# Patient Record
Sex: FEMALE | Race: WHITE | NOT HISPANIC OR LATINO | URBAN - METROPOLITAN AREA
[De-identification: names, ages, dates, MRNs, and addresses within clinical notes are randomized per-mention and may not be internally consistent; named-entity substitution may affect disease eponyms.]

---

## 2020-08-04 ENCOUNTER — OFFICE VISIT (OUTPATIENT)
Dept: URBAN - METROPOLITAN AREA CLINIC 82 | Facility: CLINIC | Age: 65
End: 2020-08-04
Payer: COMMERCIAL

## 2020-08-04 DIAGNOSIS — Z12.11 COLON CANCER SCREENING: ICD-10-CM

## 2020-08-04 DIAGNOSIS — K58.0 IRRITABLE BOWEL SYNDROME WITH DIARRHEA: ICD-10-CM

## 2020-08-04 DIAGNOSIS — R10.84 GENERALIZED ABDOMINAL PAIN: ICD-10-CM

## 2020-08-04 PROCEDURE — G8417 CALC BMI ABV UP PARAM F/U: HCPCS | Performed by: INTERNAL MEDICINE

## 2020-08-04 PROCEDURE — G9903 PT SCRN TBCO ID AS NON USER: HCPCS | Performed by: INTERNAL MEDICINE

## 2020-08-04 PROCEDURE — 99244 OFF/OP CNSLTJ NEW/EST MOD 40: CPT | Performed by: INTERNAL MEDICINE

## 2020-08-04 PROCEDURE — 99204 OFFICE O/P NEW MOD 45 MIN: CPT | Performed by: INTERNAL MEDICINE

## 2020-08-04 PROCEDURE — 1036F TOBACCO NON-USER: CPT | Performed by: INTERNAL MEDICINE

## 2020-08-04 PROCEDURE — G8427 DOCREV CUR MEDS BY ELIG CLIN: HCPCS | Performed by: INTERNAL MEDICINE

## 2020-08-04 RX ORDER — POLYETHYLENE GLYCOL-3350 AND ELECTROLYTES WITH FLAVOR PACK 240; 5.84; 2.98; 6.72; 22.72 G/278.26G; G/278.26G; G/278.26G; G/278.26G; G/278.26G
AS DIRECTED POWDER, FOR SOLUTION ORAL
Qty: 1 | Refills: 0 | OUTPATIENT
Start: 2020-08-04 | End: 2020-08-05

## 2020-08-04 RX ORDER — HYOSCYAMINE SULFATE 0.12 MG/1
1 TABLET AS NEEDED TABLET ORAL
Qty: 90 | Refills: 1 | OUTPATIENT
Start: 2020-08-04 | End: 2020-10-03

## 2020-08-04 RX ORDER — PEPPERMINT OIL 90 MG
AS DIRECTED CAPSULE, DELAYED, AND EXTENDED RELEASE ORAL
Status: ACTIVE | COMMUNITY

## 2020-08-04 RX ORDER — CHOLESTYRAMINE 4 G/9G
1 PACKET MIXED WITH WATER OR NON-CARBONATED DRINK POWDER, FOR SUSPENSION ORAL TWICE A DAY
Qty: 60 | Refills: 1 | OUTPATIENT
Start: 2020-08-04

## 2020-08-04 RX ORDER — LISINOPRIL 10 MG/1
TAKE 1 TABLET (10 MG) BY ORAL ROUTE ONCE DAILY TABLET ORAL 1
Qty: 0 | Refills: 0 | Status: ACTIVE | COMMUNITY
Start: 1900-01-01

## 2020-08-04 NOTE — HPI-TODAY'S VISIT:
08/04/2020 Patient is a 65 year old, White female, who was last seen in 2017, for IBS. Patient states that her IBS has been bothering her alot this year. She states her work hours have been adjusted to 7 am, and she wakes up with loose bowels making her either late for work or to have bowel accidents. She does report more stress as there is a heavier workload on her. She has never tried Questran powder in the past. She is sleeping well at night. She is not on any anxiety medications. She has had a colonoscopy July 27, 2010 showed small internal hemorrhoids. She is due for a colonoscopy this year. She states her grandfather passed away from stomach and colon cancer. She does report gluten and dairy sensitivity. She states that in the past, Levsin worked for her IBS.

## 2020-08-13 ENCOUNTER — OFFICE VISIT (OUTPATIENT)
Dept: URBAN - METROPOLITAN AREA MEDICAL CENTER 31 | Facility: MEDICAL CENTER | Age: 65
End: 2020-08-13
Payer: COMMERCIAL

## 2020-08-13 DIAGNOSIS — R19.4 ALTERED BOWEL HABITS: ICD-10-CM

## 2020-08-13 PROCEDURE — G9937 DIG OR SURV COLSCO: HCPCS | Performed by: INTERNAL MEDICINE

## 2020-08-13 PROCEDURE — 45380 COLONOSCOPY AND BIOPSY: CPT | Performed by: INTERNAL MEDICINE

## 2020-08-28 ENCOUNTER — ERX REFILL RESPONSE (OUTPATIENT)
Dept: URBAN - METROPOLITAN AREA CLINIC 82 | Facility: CLINIC | Age: 65
End: 2020-08-28

## 2020-08-28 RX ORDER — HYOSCYAMINE SULFATE 0.12 MG/1
1 TABLET AS NEEDED TABLET ORAL
Qty: 90 | Refills: 1

## 2020-08-28 RX ORDER — CHOLESTYRAMINE 4 G/9G
1 PACKET MIXED WITH WATER OR NON-CARBONATED DRINK POWDER, FOR SUSPENSION ORAL TWICE A DAY
Qty: 60 | Refills: 1

## 2020-09-15 ENCOUNTER — DASHBOARD ENCOUNTERS (OUTPATIENT)
Age: 65
End: 2020-09-15

## 2020-09-15 ENCOUNTER — OFFICE VISIT (OUTPATIENT)
Dept: URBAN - METROPOLITAN AREA CLINIC 82 | Facility: CLINIC | Age: 65
End: 2020-09-15
Payer: COMMERCIAL

## 2020-09-15 DIAGNOSIS — R10.84 GENERALIZED ABDOMINAL PAIN: ICD-10-CM

## 2020-09-15 DIAGNOSIS — Z12.11 COLON CANCER SCREENING: ICD-10-CM

## 2020-09-15 DIAGNOSIS — R19.7 DIARRHEA: ICD-10-CM

## 2020-09-15 DIAGNOSIS — K58.9 IRRITABLE BOWEL SYNDROME (IBS): ICD-10-CM

## 2020-09-15 PROBLEM — 102614006 GENERALIZED ABDOMINAL PAIN: Status: ACTIVE | Noted: 2020-08-04

## 2020-09-15 PROBLEM — 275978004 COLON CANCER SCREENING: Status: ACTIVE | Noted: 2020-08-04

## 2020-09-15 PROBLEM — 10743008 IRRITABLE BOWEL SYNDROME: Status: ACTIVE | Noted: 2020-08-04

## 2020-09-15 PROCEDURE — G8417 CALC BMI ABV UP PARAM F/U: HCPCS | Performed by: INTERNAL MEDICINE

## 2020-09-15 PROCEDURE — 99214 OFFICE O/P EST MOD 30 MIN: CPT | Performed by: INTERNAL MEDICINE

## 2020-09-15 PROCEDURE — 3017F COLORECTAL CA SCREEN DOC REV: CPT | Performed by: INTERNAL MEDICINE

## 2020-09-15 PROCEDURE — 1036F TOBACCO NON-USER: CPT | Performed by: INTERNAL MEDICINE

## 2020-09-15 RX ORDER — HYOSCYAMINE SULFATE 0.12 MG/1
1 TABLET AS NEEDED TABLET ORAL
Qty: 90 | Refills: 1 | Status: ACTIVE | COMMUNITY

## 2020-09-15 RX ORDER — PEPPERMINT OIL 90 MG
AS DIRECTED CAPSULE, DELAYED, AND EXTENDED RELEASE ORAL
Status: ACTIVE | COMMUNITY

## 2020-09-15 RX ORDER — CHOLESTYRAMINE 4 G/9G
1 PACKET MIXED WITH WATER OR NON-CARBONATED DRINK POWDER, FOR SUSPENSION ORAL TWICE A DAY
Qty: 60 | Refills: 1 | Status: ACTIVE | COMMUNITY

## 2020-09-15 RX ORDER — LISINOPRIL 10 MG/1
TAKE 1 TABLET (10 MG) BY ORAL ROUTE ONCE DAILY TABLET ORAL 1
Qty: 0 | Refills: 0 | Status: ACTIVE | COMMUNITY
Start: 1900-01-01

## 2020-09-15 NOTE — HPI-TODAY'S VISIT:
08/04/2020 Patient is a 65 year old, White female, who was last seen in 2017, for IBS. Patient states that her IBS has been bothering her alot this year. She states her work hours have been adjusted to 7 am, and she wakes up with loose bowels making her either late for work or to have bowel accidents. She does report more stress as there is a heavier workload on her. She has never tried Questran powder in the past. She is sleeping well at night. She is not on any anxiety medications. She has had a colonoscopy July 27, 2010 showed small internal hemorrhoids. She is due for a colonoscopy this year. She states her grandfather passed away from stomach and colon cancer. She does report gluten and dairy sensitivity. She states that in the past, Levcolumba worked for her IBS.  09/15/2020 Patient presents for a follow up office visit. Colonoscopy done on 08/13/2020 showed left sided diverticulosis and normal colonic mucosa. Random colon biopsies negative for lymphocytic colitis. Patient states she feels better all over. She reports that bloating improved. She continues to take the Questran powder which helped with symptoms of diarrhea. She states her stools are more formed now. She denies any terrible flare ups now. She has been able to incorporate yogurt back into her diet. She was still unable to tolerate ice cream. She states she feels better when she consumes whole grains. She denies any family history of Celiac disease. She did have blood test for TTG in the past, which was negative as per her.

## 2020-09-21 ENCOUNTER — ERX REFILL RESPONSE (OUTPATIENT)
Dept: URBAN - METROPOLITAN AREA CLINIC 82 | Facility: CLINIC | Age: 65
End: 2020-09-21

## 2020-09-21 RX ORDER — HYOSCYAMINE SULFATE 0.12 MG/1
1 TABLET AS NEEDED TABLET ORAL
Qty: 90 | Refills: 1

## 2020-09-21 RX ORDER — CHOLESTYRAMINE 4 G/9G
1 PACKET MIXED WITH WATER OR NON-CARBONATED DRINK POWDER, FOR SUSPENSION ORAL TWICE A DAY
Qty: 60 | Refills: 1

## 2020-10-06 ENCOUNTER — TELEPHONE ENCOUNTER (OUTPATIENT)
Dept: URBAN - METROPOLITAN AREA CLINIC 82 | Facility: CLINIC | Age: 65
End: 2020-10-06

## 2020-11-03 ENCOUNTER — TELEPHONE ENCOUNTER (OUTPATIENT)
Dept: URBAN - METROPOLITAN AREA CLINIC 82 | Facility: CLINIC | Age: 65
End: 2020-11-03

## 2020-11-03 RX ORDER — HYOSCYAMINE SULFATE 0.12 MG/1
1 TABLET AS NEEDED TABLET ORAL
Qty: 90 | Refills: 1

## 2020-12-07 ENCOUNTER — ERX REFILL RESPONSE (OUTPATIENT)
Dept: URBAN - METROPOLITAN AREA CLINIC 82 | Facility: CLINIC | Age: 65
End: 2020-12-07

## 2020-12-07 RX ORDER — HYOSCYAMINE SULFATE 0.12 MG/1
1 TABLET AS NEEDED TABLET ORAL
Qty: 90 | Refills: 1

## 2020-12-15 ENCOUNTER — OFFICE VISIT (OUTPATIENT)
Dept: URBAN - METROPOLITAN AREA CLINIC 82 | Facility: CLINIC | Age: 65
End: 2020-12-15

## 2020-12-22 ENCOUNTER — OFFICE VISIT (OUTPATIENT)
Dept: URBAN - METROPOLITAN AREA CLINIC 82 | Facility: CLINIC | Age: 65
End: 2020-12-22

## 2020-12-30 ENCOUNTER — ERX REFILL RESPONSE (OUTPATIENT)
Dept: URBAN - METROPOLITAN AREA CLINIC 82 | Facility: CLINIC | Age: 65
End: 2020-12-30

## 2020-12-30 RX ORDER — HYOSCYAMINE SULFATE 0.12 MG/1
1 TABLET AS NEEDED TABLET ORAL
Qty: 90 | Refills: 1 | OUTPATIENT

## 2020-12-30 RX ORDER — HYOSCYAMINE SULFATE 0.12 MG/1
TAKE 1 TABLET BY MOUTH EVERY 8 HOURS AS NEEDED TABLET ORAL
Qty: 270 TABLET | Refills: 1 | OUTPATIENT

## 2021-01-01 NOTE — PHYSICAL EXAM NECK/THYROID:
normal appearance , without tenderness upon palpation , no deformities , trachea midline , Thyroid normal size , no thyroid nodules , no masses , no JVD , thyroid nontender 9.392